# Patient Record
Sex: FEMALE | Race: BLACK OR AFRICAN AMERICAN
[De-identification: names, ages, dates, MRNs, and addresses within clinical notes are randomized per-mention and may not be internally consistent; named-entity substitution may affect disease eponyms.]

---

## 2018-02-15 ENCOUNTER — HOSPITAL ENCOUNTER (OUTPATIENT)
Dept: HOSPITAL 5 - GIO | Age: 48
Discharge: HOME | End: 2018-02-15
Attending: INTERNAL MEDICINE
Payer: COMMERCIAL

## 2018-02-15 VITALS — DIASTOLIC BLOOD PRESSURE: 70 MMHG | SYSTOLIC BLOOD PRESSURE: 102 MMHG

## 2018-02-15 DIAGNOSIS — K21.9: Primary | ICD-10-CM

## 2018-02-15 DIAGNOSIS — K20.8: ICD-10-CM

## 2018-02-15 DIAGNOSIS — Z79.899: ICD-10-CM

## 2018-02-15 DIAGNOSIS — K29.50: ICD-10-CM

## 2018-02-15 DIAGNOSIS — K29.80: ICD-10-CM

## 2018-02-15 DIAGNOSIS — Z90.710: ICD-10-CM

## 2018-02-15 DIAGNOSIS — K22.9: ICD-10-CM

## 2018-02-15 PROCEDURE — 88342 IMHCHEM/IMCYTCHM 1ST ANTB: CPT

## 2018-02-15 PROCEDURE — 43239 EGD BIOPSY SINGLE/MULTIPLE: CPT

## 2018-02-15 PROCEDURE — 88305 TISSUE EXAM BY PATHOLOGIST: CPT

## 2018-02-15 NOTE — DISCHARGE SUMMARY
Short Stay Discharge Plan


Activity: advance as tolerated


Weight Bearing Status: Weight Bear as Tolerated


Diet: regular


Follow up with: 


FABRICE RHODES MD [Primary Care Provider] - 7 Days

## 2018-02-15 NOTE — OPERATIVE REPORT
Operative Report


Operative Report: 


Date: 02/15/2018 


   


Operative Report: 





Date of procedure: 02/15/2018





Procedure: Esophagogastroduodenoscopy with multiple mucosal biopsies.





Attending physician: Kavin Manuel MD





: Kavin Manuel MD





Indication: Patient is a 48 -year-old female who presented with a history of 

recurrent epigastric pain, heartburn, indigestion and nausea and vomiting.  An 

upper endoscopy is done to assess patient, so that treatment may be directed 

based on the findings.





Consent: Informed consent was obtained after advising the patient and family 

regarding nature of this procedure, its indications, potential benefits as well 

as possible complications including but not limited to bleeding perforation and 

adverse reaction to medication, infection as well as other cardiopulmonary 

complications.  An informed written and verbal consent was then obtained after 

due opportunity was provided for questions and answers.





Monitoring: Patient was monitored continuously with pulse oximetry and 

electrocardiographic recordings as well as blood pressure recordings.  Vital 

signs remained stable throughout this procedure with no untoward events.





Preoperative assessment: Patient was assessed immediately prior to this 

procedure for capacity to tolerate monitored anesthesia care and moderate 

sedation as well as general anesthesia.  Patient's ASA classification is 2,  

Mallampati class is 2, Hyomental distance is 3.





Instrument: Cool Containersn video endoscope





Medications: Propofol given intravenously in divided doses.  For details please 

refer to anesthesia records.





Description of procedure: Patient was placed in the left lateral decubitus 

position after achieving sedation, the endoscope was introduced into the 

esophagus under direct vision.  It was then advanced beyond the esophagus into 

the stomach and then beyond the stomach into the duodenum and to the second 

portion of the duodenum.  It was subsequently withdrawn with careful inspection 

of all mucosal surfaces with the following findings.





Findings: Patient had mild erosive esophagitis involving the distal esophagus.. 

Patient has  an irregular Z line at 38 cm.  There was erythema in the gastric 

antrum. Biopsies of the antrum were obtained for histopathology.  The duodenum 

appeared to have a healed ulcer pit.  There were multiple erosions seen in the 

duodenal bulb and beyond the C-loop.





Impression: Mild erosive esophagitis.


 Irregular Z line. 


Gastric antral erythema 


Duodenitis 


Possible healed ulcer in the duodenal bulb


Plan: Continue treatment with proton pump inhibitors.


Follow pathology report.


Direct additional treatment based on the pathology report.


Patient will be observed clinically.  Additional recommendations will be made 

follow-up.

## 2019-02-27 NOTE — ANESTHESIA CONSULTATION
Anesthesia Consult and Med Hx


Date of service: 02/15/18





- Airway


Anesthetic Teeth Evaluation: Dentures (upper)


ROM Head & Neck: Adequate


Mental/Hyoid Distance: Adequate


Mallampati Class: Class II


Intubation Access Assessment: Probably Good





- Pulmonary Exam


CTA: Yes





- Cardiac Exam


Cardiac Exam: RRR





- Pre-Operative Health Status


ASA Pre-Surgery Classification: ASA2


Proposed Anesthetic Plan: MAC





- Gastrointestinal


Hx Gastroesophageal Reflux Disease: Yes Purse String (Simple) Text: Given the location of the defect and the characteristics of the surrounding skin a purse string simple closure was deemed most appropriate.  Undermining was performed circumferentially around the surgical defect.  A purse string suture was then placed and tightened.